# Patient Record
Sex: MALE | URBAN - METROPOLITAN AREA
[De-identification: names, ages, dates, MRNs, and addresses within clinical notes are randomized per-mention and may not be internally consistent; named-entity substitution may affect disease eponyms.]

---

## 2019-10-25 ENCOUNTER — APPOINTMENT (OUTPATIENT)
Dept: URBAN - METROPOLITAN AREA SURGERY 9 | Age: 64
Setting detail: DERMATOLOGY
End: 2019-10-25

## 2019-10-25 DIAGNOSIS — L82.1 OTHER SEBORRHEIC KERATOSIS: ICD-10-CM

## 2019-10-25 PROCEDURE — OTHER OTHER: OTHER

## 2019-10-25 PROCEDURE — OTHER OBSERVATION: OTHER

## 2019-10-25 PROCEDURE — 99202 OFFICE O/P NEW SF 15 MIN: CPT

## 2019-10-25 ASSESSMENT — LOCATION ZONE DERM: LOCATION ZONE: EYELID

## 2019-10-25 ASSESSMENT — LOCATION SIMPLE DESCRIPTION DERM: LOCATION SIMPLE: RIGHT LATERAL CANTHUS

## 2019-10-25 ASSESSMENT — LOCATION DETAILED DESCRIPTION DERM: LOCATION DETAILED: RIGHT LATERAL CANTHUS

## 2019-10-25 NOTE — PROCEDURE: OTHER
Other (Free Text): Discussed treatment options such as monitor for changes, LN2 or biopsy. Patient opted for LN2 at this time.
Detail Level: Simple
Note Text (......Xxx Chief Complaint.): This diagnosis correlates with the
Other (Free Text): Test LN2

## 2023-04-06 NOTE — PROCEDURE: OBSERVATION
CHIEF COMPLAINT: Patient is here for follow up of Type 2 Diabetes Mellitus    HPI:     Dakota Boyer is a 79 y.o. male with Type 2 Diabetes Mellitus here for follow up.    Labs from 4/6/2023 show a1c is 7.9%  Labs from 3/4/2022 show a1c was 5.7%  Labs from 12/28/2021 show a1c was 6.0%  Labs from Sep 21, 2021 show A1c was 6.9%  Labs from 5/18/2021 show A1c was  6.7%  Labs from 7/1/2020 show A1c was 6.4%      He has a history of coronary artery disease with prior PCI in 2013 and has a pacemaker.  He was previously followed by Dr. Charles in cardiology and is now followed by Dr. Hsu.  Other comorbid conditions include obesity, sleep apnea, hyperlipidemia, hypertension, and nonproliferative diabetic retinopathy OU       On follow-up he is on   Synjardy 12.5/1000 mg twice a day,   Lantus 40 units daily        He ran out of Trulicity 3.0mg weekly [facility]   He doesn't want to go to a local pharmacy because his copay will be higher   He also has chronic low back pain and got an NAVIN with steroids        He has hyperlipidemia and is taking rosuvastatin   He denies muscle aches or muscle weakness  LDL cholesterol was 27  on 10/2022      He has essential hypertension and is taking valsartan.    Blood pressure is at goal  He has new onset albuminuria  UACR was 230 on 10/2022  UACR was < 30 on 7/2021        He is up-to-date with his eye exams [facility] and last eye exam was on 7/27/2022        He does have subclinical hypothyroidism  TPO antibodies are negative  He is on Levothyroxine 50mcg daily  He denies constipation  TSH is 1.70 on 10/2022 (levo 50)  TSH was 5.59 with a free T4 on 3/4/2022 (levo 25)  TSH was 3.4 with a free T4 of 2/2/2022  TSH was 6.1 with a free T4 of 1.08 on July 30, 2021  He is currently not on thyroid hormone replacement therapy              BG Diary:  Patient forgot blood sugar logs      Weight has been stable    Diabetes Complications   Retinopathy: Known 
retinopathy.  Last eye exam: July 2022  Neuropathy: Denies paresthesias or numbness in hands or feet. Denies any foot wounds.  Exercise: Minimal.  Diet: Fair.  Patient's medications, allergies, and social histories were reviewed and updated as appropriate.    ROS:     CONS:     No fever, no chills   EYES:     No diplopia, no blurry vision   CV:           No chest pain, no palpitations   PULM:     No SOB, no cough, no hemoptysis.   GI:            No nausea, no vomiting, no diarrhea, no constipation   ENDO:     No polyuria, no polydipsia, no heat intolerance, no cold intolerance       Past Medical History:  Problem List:  2023-04: Pulmonary nodule  2023-03: Microalbuminuria  2022-12: S/P insertion of spinal cord stimulator  2022-12: History of laminectomy  2022-09: Cyst of left kidney  2022-08: Numbness and tingling in both hands  2022-03: Pancytopenia ([facility])  2022-03: Hypokalemia  2022-03: History of syncope  2022-03: Constipation  2022-03: Black stools  2022-03: Unintentional weight loss  2022-03: Syncope  2022-03: Neutropenia ([facility])  2022-03: Syncope and collapse  2021-11: Former smoker  2021-11: History of bilateral knee replacement  2020-07: Long-term insulin use ([facility])  2020-06: Moderate aortic stenosis  2019-12: Benign prostatic hyperplasia with urinary obstruction  2018-12: Bilateral lower extremity edema  2018-08: Prostate cancer ([facility])  2018-01: Elevated PSA  2018-01: Bilateral primary osteoarthritis of knee  2017-06: Altered level of consciousness: March 2017  2016-10: Cervical spondylosis  2016-06: Type 2 diabetes mellitus with other specified complication   ([facility])  2016-06: Hypertensive retinopathy of both eyes  2016-03: Depression with anxiety  2016-03: Obstructive sleep apnea on CPAP  2014-08: Type 2 diabetes mellitus, uncontrolled  2014-08: Subclinical hypothyroidism  2014-08: Class 2 severe obesity with serious comorbidity and body   mass index (BMI) of 35.0 to 35.9 in adult ([facility])  2013-11: History of 
diverticulitis  2013-10: Other dyspnea and respiratory abnormality  2013-09: Fatigue  2013-09: Shortness of breath  2013-08: Coronary artery disease due to calcified coronary lesion  2013-04: Incisional infection  2013-02: DJD (degenerative joint disease) of cervical spine  2012-11: Degeneration of lumbar or lumbosacral intervertebral disc  2012-10: Sick sinus syndrome (HCC)  2012-10: Pacemaker  2012-09: Hyperlipidemia associated with type 2 diabetes mellitus (HCC)  2012-09: Episodic lightheadedness  2012-09: RBBB  2012-04: Back pain, chronic  2012-04: Hypertension associated with type 2 diabetes mellitus (HCC)  2012-04: Neck pain  2012-04: Insomnia      Past Surgical History:  Past Surgical History:   Procedure Laterality Date    WI DSTR NROLYTC AGNT PARVERTEB FCT SNGL CRVCL/THORA Right 10/19/2016    Procedure: NEURO DEST FACET C/T W/IG SNGL - 3ON-C3, C8-T1    SINERGY;  Surgeon: Gaurang Pruett M.D.;  Location: SURGERY [facility];  Service: Pain Management    WI DSTR NROLYTC AGNT PARVERTEB FCT ADDL CRVCL/THORA  10/19/2016    Procedure: NEURO DEST FACET C/T W/IG ADDL;  Surgeon: Gaurang Pruett M.D.;  Location: SURGERY [facility];  Service: Pain Management    WI FLUOROSCOPIC GUIDANCE NEEDLE PLACEMENT  10/19/2016    Procedure: FLOURO GUIDE NEEDLE PLACEMENT;  Surgeon: Gaurang Pruett M.D.;  Location: [facility];  Service: Pain Management    SHOULDER ARTHROSCOPY Right 2015    torn bicep tendon and spurs    RECOVERY  10/17/2013    Performed by Cath-Recovery Surgery at SURGERY SAME DAY [facility] ORS    WOUND DEHISCENCE  4/10/2013    Performed by Tu Jain M.D. at SURGERY [facility] ORS    CERVICAL FUSION POSTERIOR  2/27/2013    Performed by Tu Jain M.D. at SURGERY [facility] ORS    CERVICAL LAMINECTOMY POSTERIOR  2/27/2013    Performed by Tu Jain M.D. at SURGERY [facility] ORS    LUMBAR LAMINECTOMY DISKECTOMY  11/20/2012    Performed by Tu Jain M.D. at SURGERY [facility] 
"ORS    RECOVERY  10/4/2012    Performed by Cath-Recovery Surgery at SURGERY SAME DAY [facility] ORS    PACEMAKER INSERTION  2012    [facility] [employer]  2110 implanted by Dr. Waite.    SHOULDER DECOMPRESSION Left     Left rotator cuff    ARTHROPLASTY Left     ARTHROSCOPY, KNEE Bilateral     ORTHOPEDIC OSTEOTOMY      Both knees several times, 8 total    OTHER      SPINAL CORD STIMULATOR  1 month ago    TONSILLECTOMY      VASECTOMY          Allergies:  Aleve cold & [pseudoephedrine-naproxen na]; Ceftriaxone sodium; Naproxen; Latex; and Tape     Social History:  Social History     Tobacco Use    Smoking status: Former     Packs/day: 1.00     Years: 40.00     Pack years: 40.00     Types: Cigarettes     Quit date: 1/3/1990     Years since quittin.2    Smokeless tobacco: Never   Vaping Use    Vaping Use: Never used   Substance Use Topics    Alcohol use: No     Alcohol/week: 0.0 oz    Drug use: No        Family History:   family history includes Alcohol/Drug in his brother and mother; Cancer in his brother and brother; Diabetes in his brother; Heart Disease in his brother, maternal grandmother, and sister; Heart Disease (age of onset: 36) in his father; Heart Disease (age of onset: 60) in his brother; Hyperlipidemia in his brother and maternal grandmother; Hypertension in his brother and maternal grandmother; Lung Disease in his brother and mother; No Known Problems in his maternal grandfather and paternal grandmother; Sleep Apnea in his brother.      PHYSICAL EXAM:   OBJECTIVE:  Vital signs: /64 (BP Location: Left arm, Patient Position: Sitting)   Pulse 86   Ht 1.753 m (5' 9\")   Wt 93.4 kg (206 lb)   SpO2 94%   BMI 30.42 kg/m²   GENERAL: Well-developed, well-nourished in no apparent distress.   EYE:  No ocular asymmetry, PERRLA  HENT: Pink, moist mucous membranes.    NECK: No thyromegaly.   CARDIOVASCULAR:  No murmurs  LUNGS: Clear breath sounds  ABDOMEN: Soft, nontender "
Size Of Lesion In Cm (Optional): 1.3
  EXTREMITIES: No clubbing, cyanosis, or edema.   NEUROLOGICAL: No gross focal motor abnormalities   LYMPH: No cervical adenopathy palpated.   SKIN: No rashes, lesions.       Labs:  Lab Results   Component Value Date/Time    HBA1C 7.9 (A) 04/05/2023 10:13 AM        Lab Results   Component Value Date/Time    WBC 6.0 07/25/2022 07:31 AM    RBC 4.59 (L) 07/25/2022 07:31 AM    HEMOGLOBIN 14.5 07/25/2022 07:31 AM    MCV 89.1 07/25/2022 07:31 AM    MCH 31.6 07/25/2022 07:31 AM    MCHC 35.5 (H) 07/25/2022 07:31 AM    RDW 50.8 (H) 07/25/2022 07:31 AM    MPV 10.6 07/25/2022 07:31 AM       Lab Results   Component Value Date/Time    SODIUM 138 10/24/2022 12:22 PM    POTASSIUM 4.3 10/24/2022 12:22 PM    CHLORIDE 103 10/24/2022 12:22 PM    CO2 22 10/24/2022 12:22 PM    ANION 13.0 10/24/2022 12:22 PM    GLUCOSE 112 (H) 10/24/2022 12:22 PM    BUN 24 (H) 10/24/2022 12:22 PM    CREATININE 1.03 10/24/2022 12:22 PM    CALCIUM 10.3 10/24/2022 12:22 PM    ASTSGOT 28 10/24/2022 12:22 PM    ALTSGPT 21 10/24/2022 12:22 PM    TBILIRUBIN 1.1 10/24/2022 12:22 PM    ALBUMIN 4.1 10/24/2022 12:22 PM    TOTPROTEIN 6.8 10/24/2022 12:22 PM    GLOBULIN 2.7 10/24/2022 12:22 PM    AGRATIO 1.5 10/24/2022 12:22 PM       Lab Results   Component Value Date/Time    CHOLSTRLTOT 94 (L) 05/13/2019 0711    TRIGLYCERIDE 120 05/13/2019 0711    HDL 30 (A) 05/13/2019 0711    LDL 40 05/13/2019 0711       Lab Results   Component Value Date/Time    MALBCRT 230 (H) 10/24/2022 12:22 PM    MICROALBUR 11.4 10/24/2022 12:22 PM        Lab Results   Component Value Date/Time    TSHULTRASEN 3.650 10/08/2019 1546     No results found for: FREEDIR  Lab Results   Component Value Date/Time    FREET3 3.6 07/22/2014 0743     No results found for: THYSTIMIG        ASSESSMENT/PLAN:     1. Type 2 diabetes mellitus with hyperglycemia, with long-term current use of insulin (HCC)  Previously controlled  His a1c raisa from 5.7% to 7.9% due to lack of meds   Increase Lantus to 50u daily 
Detail Level: Detailed
  Restart Trulicity 1.5mg weekly ( [facility]) sample given (1 box)  Continue Synjardy twice a day  He is up-to-date with his serum creatinine and fasting lipids  Follow-up with [facility] in 3 mos    2. Subclinical hypothyroidism  Controlled   Continue Levothyroxine 50 MCG daily  Reviewed how to properly take levothyroxine  Repeat TSH levels in 3 months    3. Dyslipidemia  Controlled  Continue atorvastatin  Repeat fasting lipids in 3 months    4. Long-term insulin use (HCC)  Patient is on long-term basal insulin therapy with a GLP-1 and SGLT2 inhibitor for type 2 diabetes management      Return in about 4 months (around 8/6/2023).      Thank you kindly for allowing me to participate in the diabetes care plan for this patient.    Luis Daniel Ring MD, JORGE A, BRIANAU      CC:   Tanya Rayo P.A.-C.  
X Size Of Lesion In Cm (Optional): 0